# Patient Record
Sex: FEMALE | Race: WHITE
[De-identification: names, ages, dates, MRNs, and addresses within clinical notes are randomized per-mention and may not be internally consistent; named-entity substitution may affect disease eponyms.]

---

## 2018-04-09 ENCOUNTER — HOSPITAL ENCOUNTER (INPATIENT)
Dept: HOSPITAL 26 - MED | Age: 58
LOS: 1 days | Discharge: LEFT BEFORE DISCHARGE | DRG: 137 | End: 2018-04-10
Attending: FAMILY MEDICINE | Admitting: FAMILY MEDICINE
Payer: COMMERCIAL

## 2018-04-09 VITALS — SYSTOLIC BLOOD PRESSURE: 112 MMHG | DIASTOLIC BLOOD PRESSURE: 69 MMHG

## 2018-04-09 VITALS — HEIGHT: 61 IN | WEIGHT: 165 LBS | BODY MASS INDEX: 31.15 KG/M2

## 2018-04-09 DIAGNOSIS — Z79.899: ICD-10-CM

## 2018-04-09 DIAGNOSIS — G40.909: ICD-10-CM

## 2018-04-09 DIAGNOSIS — Z93.1: ICD-10-CM

## 2018-04-09 DIAGNOSIS — J69.0: Primary | ICD-10-CM

## 2018-04-09 DIAGNOSIS — R13.10: ICD-10-CM
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
## 2018-04-09 DIAGNOSIS — G93.40: ICD-10-CM

## 2018-04-09 DIAGNOSIS — J95.01: ICD-10-CM

## 2018-04-09 DIAGNOSIS — J96.10: ICD-10-CM

## 2018-04-09 DIAGNOSIS — Z86.73: ICD-10-CM

## 2018-04-09 DIAGNOSIS — Z99.11: ICD-10-CM
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
## 2018-04-09 DIAGNOSIS — Z53.21: ICD-10-CM

## 2018-04-09 NOTE — NUR
PT BIB AMBULANCE FROM [facility] DUE TO TRACH SITE BLEEDING STARTED 1900 TONIGHT. 
PER REPORT, TRACH WAS CHANGED YESTERDAY AND THERE WAS NO SYMPTOMS UNTIL THIS 
EVENING THAT PT STARTED BLEEDING AT THE TRACH SITE. PT HAS HX OF CHRNOIC 
RESPIRATORY FAILURE. PT NOTED WITH BLEEDING FROM THE TRACH SITE BUT CONTROLLED. 
PT VSS AND RR EVEN AND UNLABORED. ALERT, NONVERBAL. UNABLE TO FOLLOW 
DIRECTIONS. PERRL. BILATERAL LUNGS SOUND CLEAR. CAP REFILL WITHIN 3 SEC. 
GENERALIZED WEAKNESS NOTED. AFEBRILE. FLACC 0. ER MD MADE AWARE.

## 2018-04-10 VITALS — SYSTOLIC BLOOD PRESSURE: 140 MMHG | DIASTOLIC BLOOD PRESSURE: 83 MMHG

## 2018-04-10 VITALS — SYSTOLIC BLOOD PRESSURE: 138 MMHG | DIASTOLIC BLOOD PRESSURE: 95 MMHG

## 2018-04-10 VITALS — DIASTOLIC BLOOD PRESSURE: 90 MMHG | SYSTOLIC BLOOD PRESSURE: 133 MMHG

## 2018-04-10 VITALS — DIASTOLIC BLOOD PRESSURE: 82 MMHG | SYSTOLIC BLOOD PRESSURE: 135 MMHG

## 2018-04-10 LAB
ALBUMIN FLD-MCNC: 2.9 G/DL (ref 3.4–5)
ALBUMIN FLD-MCNC: 2.9 G/DL (ref 3.4–5)
ANION GAP SERPL CALCULATED.3IONS-SCNC: 11.3 MMOL/L (ref 8–16)
ANION GAP SERPL CALCULATED.3IONS-SCNC: 13.5 MMOL/L (ref 8–16)
APPEARANCE UR: CLEAR
AST SERPL-CCNC: 33 U/L (ref 15–37)
AST SERPL-CCNC: 36 U/L (ref 15–37)
BASOPHILS # BLD AUTO: 0 K/UL (ref 0–0.22)
BASOPHILS # BLD AUTO: 0 K/UL (ref 0–0.22)
BASOPHILS NFR BLD AUTO: 0.2 % (ref 0–2)
BASOPHILS NFR BLD AUTO: 0.3 % (ref 0–2)
BILIRUB SERPL-MCNC: 0.5 MG/DL (ref 0–1)
BILIRUB SERPL-MCNC: 0.5 MG/DL (ref 0–1)
BILIRUB UR QL STRIP: NEGATIVE
BUN SERPL-MCNC: 14 MG/DL (ref 7–18)
BUN SERPL-MCNC: 15 MG/DL (ref 7–18)
CHLORIDE SERPL-SCNC: 94 MMOL/L (ref 98–107)
CHLORIDE SERPL-SCNC: 96 MMOL/L (ref 98–107)
CO2 SERPL-SCNC: 30 MMOL/L (ref 21–32)
CO2 SERPL-SCNC: 30.8 MMOL/L (ref 21–32)
COLOR UR: YELLOW
CREAT SERPL-MCNC: 0.5 MG/DL (ref 0.6–1.3)
CREAT SERPL-MCNC: 0.5 MG/DL (ref 0.6–1.3)
EOSINOPHIL # BLD AUTO: 0.1 K/UL (ref 0–0.4)
EOSINOPHIL # BLD AUTO: 0.1 K/UL (ref 0–0.4)
EOSINOPHIL NFR BLD AUTO: 0.6 % (ref 0–4)
EOSINOPHIL NFR BLD AUTO: 0.6 % (ref 0–4)
ERYTHROCYTE [DISTWIDTH] IN BLOOD BY AUTOMATED COUNT: 14.3 % (ref 11.6–13.7)
ERYTHROCYTE [DISTWIDTH] IN BLOOD BY AUTOMATED COUNT: 14.4 % (ref 11.6–13.7)
GFR SERPL CREATININE-BSD FRML MDRD: 162 ML/MIN (ref 90–?)
GFR SERPL CREATININE-BSD FRML MDRD: 162 ML/MIN (ref 90–?)
GLUCOSE SERPL-MCNC: 106 MG/DL (ref 74–106)
GLUCOSE SERPL-MCNC: 110 MG/DL (ref 74–106)
GLUCOSE UR STRIP-MCNC: NEGATIVE MG/DL
HCT VFR BLD AUTO: 37.6 % (ref 36–48)
HCT VFR BLD AUTO: 38.2 % (ref 36–48)
HGB BLD-MCNC: 12.4 G/DL (ref 12–16)
HGB BLD-MCNC: 12.8 G/DL (ref 12–16)
HGB UR QL STRIP: NEGATIVE
LEUKOCYTE ESTERASE UR QL STRIP: NEGATIVE
LYMPHOCYTES # BLD AUTO: 3.3 K/UL (ref 2.5–16.5)
LYMPHOCYTES # BLD AUTO: 3.6 K/UL (ref 2.5–16.5)
LYMPHOCYTES NFR BLD AUTO: 28.3 % (ref 20.5–51.1)
LYMPHOCYTES NFR BLD AUTO: 34.4 % (ref 20.5–51.1)
MCH RBC QN AUTO: 28 PG (ref 27–31)
MCH RBC QN AUTO: 28 PG (ref 27–31)
MCHC RBC AUTO-ENTMCNC: 33 G/DL (ref 33–37)
MCHC RBC AUTO-ENTMCNC: 34 G/DL (ref 33–37)
MCV RBC AUTO: 84.2 FL (ref 80–94)
MCV RBC AUTO: 85.2 FL (ref 80–94)
MONOCYTES # BLD AUTO: 0.9 K/UL (ref 0.8–1)
MONOCYTES # BLD AUTO: 1.6 K/UL (ref 0.8–1)
MONOCYTES NFR BLD AUTO: 12.6 % (ref 1.7–9.3)
MONOCYTES NFR BLD AUTO: 9.5 % (ref 1.7–9.3)
NEUTROPHILS # BLD AUTO: 5.4 K/UL (ref 1.8–7.7)
NEUTROPHILS # BLD AUTO: 7.4 K/UL (ref 1.8–7.7)
NEUTROPHILS NFR BLD AUTO: 55.2 % (ref 42.2–75.2)
NEUTROPHILS NFR BLD AUTO: 58.3 % (ref 42.2–75.2)
NITRITE UR QL STRIP: NEGATIVE
PH UR STRIP: 7 [PH] (ref 5–9)
PLATELET # BLD AUTO: 170 K/UL (ref 140–450)
PLATELET # BLD AUTO: 170 K/UL (ref 140–450)
POTASSIUM SERPL-SCNC: 4.1 MMOL/L (ref 3.5–5.1)
POTASSIUM SERPL-SCNC: 4.5 MMOL/L (ref 3.5–5.1)
RBC # BLD AUTO: 4.41 MIL/UL (ref 4.2–5.4)
RBC # BLD AUTO: 4.53 MIL/UL (ref 4.2–5.4)
SODIUM SERPL-SCNC: 132 MMOL/L (ref 136–145)
SODIUM SERPL-SCNC: 135 MMOL/L (ref 136–145)
WBC # BLD AUTO: 12.8 K/UL (ref 4.8–10.8)
WBC # BLD AUTO: 9.7 K/UL (ref 4.8–10.8)

## 2018-04-10 RX ADMIN — SODIUM CHLORIDE SCH MLS/HR: 9 INJECTION, SOLUTION INTRAVENOUS at 03:55

## 2018-04-10 RX ADMIN — DEXTROSE SCH MLS/HR: 50 INJECTION, SOLUTION INTRAVENOUS at 06:00

## 2018-04-10 RX ADMIN — SODIUM CHLORIDE SCH MLS/HR: 9 INJECTION, SOLUTION INTRAVENOUS at 15:59

## 2018-04-10 RX ADMIN — DEXTROSE SCH MLS/HR: 50 INJECTION, SOLUTION INTRAVENOUS at 04:59

## 2018-04-10 NOTE — NUR
4/10/18 

RD INITIAL ASSESSMENT COMPLETED



PLEASE REFER TO NUTRITION ASSESSMENT UNDER CARE ACTIVITY FOR ESTIMATED NUTRITIONAL NEEDS. 



1. RECOMMEND NUTREN PULMONARY 45 ML/HR X 24 HR. BEGIN FEEDS AT 20 ML/HR, ADVANCE 20 ML/HR Q 
SHIFT AS TOLERATED UNTIL GOAL RATE IS REACHED. PROVIDES 1620 KCAL, 69 G PROTEIN, 845 ML FREE 
WATER.

2. RECOMMEND 20 ML FREE WATER FLUSH Q6H, PROVIDES 80 ML FREE WATER. 

3. RD TO FOLLOW-UP 2-3 DAYS, HIGH RISK 



JERMAINE PERRY RD

## 2018-04-10 NOTE — NUR
UNABLE TO GET SPUTUM SAMPLE. PATIENT HAS BRIGHT RED BLOODY SECRETIONS. CAN NOT SUBMIT THE 
SAMPLE TO LAB

## 2018-04-10 NOTE — NUR
PT DISCHARGED TO [facility] VIA [facility] X 3 ASSISTS. VITAL SIGNS STABLE. NO ACUTE 
DISTRESS NOTED UPON DISCHARGE.

## 2018-04-10 NOTE — NUR
PT BEING ADMITTED TO ICU 2 FROM ER VIA [facility] AT THIS TIME. WILL ADMIT THE PT.

## 2018-04-10 NOTE — NUR
REPORT GIVEN TO WANDA MILLER FOR CONTINUITY OF CARE. VSS AND RR EVEN AND UNLABORED. CALL LIGHT 
IN REACH. HOB ELEVATED TO 30 DEGREES. BED KEPT TO THE LOWEST.

## 2018-04-10 NOTE — NUR
PATIENT HAS BEEN SCREENED AND CATEGORIZED AS HIGH NUTRITION RISK. PATIENT WILL BE SEEN 
WITHIN 1-2 DAYS OF ADMISSION.



4/10/18 - 4/11/18



JERMAINE PERRY RD

## 2018-04-10 NOTE — NUR
trach care done on patient. pt has stopped bleeding at the trach site.clean 
trach tie and gauze replaced. no sob noted. pt fio2 decreased to 28% post abg 
results

## 2018-04-10 NOTE — NUR
Patient will be admitted to care of . Admited to ICU.  Will go to room 
2. Belongings list completed.  Report to WANDA PHILIP.

## 2018-04-10 NOTE — NUR
RECEIVED REPORT FROM NIGHT NURSE. PT IS NONVERBAL, UNABLE TO FOLLOW COMMANDS. PERRL. 
AFEBRILE. FLACC 0. SINUS RHYTHM ON MONITOR. TRACH TO T-BAR WITH COOL AEROSOL 28% FIO2. NO 
BLEEDING NOTED ON TRACH SITE. BILATERAL LUNGS CLEAR BUT DIMINISHED LOWER LOBES. PERIPHERAL 
IV G20 TO RIGHT HAND PATENT AND ASYMPTOMATIC, RECEIVING ORDERED IV FLUID. G-TUBE TO THE LEFT 
UPPER QUADRANT NOTED, BOWEL SOUNDS ACTIVE. HARO CATH IN PLACE DRAINING URINE TO GRAVITY 
DRAINAGE BAG. PERINEAL EXCORIATION NOTED. ALL EXTREMITIES RIGID. SKIN IS DRY AND WARM TO 
TOUCH. HPB 30 DEGREES, BED IN LOWEST POSITION AND CALL LIGHT WITHIN REACH. WILL CONTINUE TO 
MONITOR.

## 2018-04-10 NOTE — NUR
RECEIVED PT ON 28% COOL AEROSOL PT TRACH PORTEX 7 IS SECURE BS DIMINSHED I\L SX AND LAVAGE 
MOD BLOODY ICE LAVAGE PLACED BEDSIDE PT IN HF QUIET

## 2018-04-10 NOTE — NUR
DR. CORNELL CALLED BACK. REPORTED CRITICAL LAB AND ADMITTING DIET ORDER BEING CARDIAC DIET. NEW 
ORDER RECEIVED TO DC CARDIAC DIET AND CONSULT DIETITIAN. WILL CARRY OUT.

## 2018-04-10 NOTE — NUR
2350 CALLED TO ER TO SEE NEW TRACH PATIENT FROM [facility]. PATIENT HAD LARGE AMTS OF 
BLOOD IN TRACH AND AROUND TRACH. SXNED LG RED BLOOD CLOT AND CLEANED UP PT. 
PLACED PT ON COOL AEROSOL OF 40% FIO2. PT SATS 100% NO REAL DISTRESS NOTED. 
WILL CONTINUE TO CHECK TRACH SITE AND KEEP IT CLEAN. PT HAS PORTEX 7

## 2018-04-10 NOTE — NUR
PT AWAKE, NONVERBAL, UNABLE TO FOLLOW DIRECTIONS. PERRL. BILATERAL LUNGS SOUND CLEAR AND 
DIMINISHED LOWER LOBES. TRACH TO T-BAR WITH COOL AEROSOL 28% FIO2. TRACH SIDE NOTED WITH NO 
MORE BLEEDING. AFEBRILE. ALL EXTREMITIES RIGID AND GENERALIZED WEAKNESS NOTED. PERIPHERAL IV 
TO RIGHT HAND 20G. ALL PATENT AND ASYMPTOMATIC. CAP REFILLS WITHIN 3 SEC. G-TUBE ON THE LEFT 
UPPDER QUAD NOTED. PATENT AND IN PLACE. BOWEL SOUND ACTIVE FROM ALL 4 QUADS. HARO CATHETER 
DRAINING CLEAR YELLOW URINE. RIGHT SIDE PERINEAL EXCORIATION NOTED UPON ADMISSION. PICTURE 
TAKEN. FLACC 0. CALL LIGHT IN REACH AND HOB ELEVATED TO 30 DEGREES.  BED KEPT TO THE LOWEST. 
 WILL CONTINUE TO MONITOR.